# Patient Record
Sex: FEMALE
[De-identification: names, ages, dates, MRNs, and addresses within clinical notes are randomized per-mention and may not be internally consistent; named-entity substitution may affect disease eponyms.]

---

## 2024-08-22 ENCOUNTER — ASOB RESULT (OUTPATIENT)
Age: 34
End: 2024-08-22

## 2024-08-22 ENCOUNTER — APPOINTMENT (OUTPATIENT)
Dept: ANTEPARTUM | Facility: CLINIC | Age: 34
End: 2024-08-22

## 2024-08-22 ENCOUNTER — LABORATORY RESULT (OUTPATIENT)
Age: 34
End: 2024-08-22

## 2024-08-22 PROCEDURE — 76801 OB US < 14 WKS SINGLE FETUS: CPT | Mod: 59

## 2024-08-22 PROCEDURE — 76813 OB US NUCHAL MEAS 1 GEST: CPT

## 2024-10-10 PROBLEM — Z00.00 ENCOUNTER FOR PREVENTIVE HEALTH EXAMINATION: Status: ACTIVE | Noted: 2024-10-10

## 2024-10-14 ENCOUNTER — ASOB RESULT (OUTPATIENT)
Age: 34
End: 2024-10-14

## 2024-10-14 ENCOUNTER — APPOINTMENT (OUTPATIENT)
Dept: ANTEPARTUM | Facility: CLINIC | Age: 34
End: 2024-10-14
Payer: MEDICAID

## 2024-10-14 PROCEDURE — 76811 OB US DETAILED SNGL FETUS: CPT

## 2025-03-01 ENCOUNTER — INPATIENT (INPATIENT)
Facility: HOSPITAL | Age: 35
LOS: 1 days | Discharge: HOME | End: 2025-03-03
Attending: STUDENT IN AN ORGANIZED HEALTH CARE EDUCATION/TRAINING PROGRAM | Admitting: STUDENT IN AN ORGANIZED HEALTH CARE EDUCATION/TRAINING PROGRAM

## 2025-03-01 VITALS
DIASTOLIC BLOOD PRESSURE: 74 MMHG | HEART RATE: 90 BPM | TEMPERATURE: 98 F | SYSTOLIC BLOOD PRESSURE: 130 MMHG | RESPIRATION RATE: 17 BRPM

## 2025-03-01 LAB
BASOPHILS # BLD AUTO: 0.06 K/UL — SIGNIFICANT CHANGE UP (ref 0–0.2)
BASOPHILS NFR BLD AUTO: 0.6 % — SIGNIFICANT CHANGE UP (ref 0–2)
BLD GP AB SCN SERPL QL: NEGATIVE — SIGNIFICANT CHANGE UP
EOSINOPHIL # BLD AUTO: 0.14 K/UL — SIGNIFICANT CHANGE UP (ref 0–0.5)
EOSINOPHIL NFR BLD AUTO: 1.4 % — SIGNIFICANT CHANGE UP (ref 0–6)
HCT VFR BLD CALC: 37 % — SIGNIFICANT CHANGE UP (ref 34.5–45)
HGB BLD-MCNC: 12.9 G/DL — SIGNIFICANT CHANGE UP (ref 11.5–15.5)
IANC: 7.54 K/UL — HIGH (ref 1.8–7.4)
IMM GRANULOCYTES NFR BLD AUTO: 1.1 % — HIGH (ref 0–0.9)
LYMPHOCYTES # BLD AUTO: 1.63 K/UL — SIGNIFICANT CHANGE UP (ref 1–3.3)
LYMPHOCYTES # BLD AUTO: 16.1 % — SIGNIFICANT CHANGE UP (ref 13–44)
MCHC RBC-ENTMCNC: 30.9 PG — SIGNIFICANT CHANGE UP (ref 27–34)
MCHC RBC-ENTMCNC: 34.9 G/DL — SIGNIFICANT CHANGE UP (ref 32–36)
MCV RBC AUTO: 88.5 FL — SIGNIFICANT CHANGE UP (ref 80–100)
MONOCYTES # BLD AUTO: 0.63 K/UL — SIGNIFICANT CHANGE UP (ref 0–0.9)
MONOCYTES NFR BLD AUTO: 6.2 % — SIGNIFICANT CHANGE UP (ref 2–14)
NEUTROPHILS # BLD AUTO: 7.54 K/UL — HIGH (ref 1.8–7.4)
NEUTROPHILS NFR BLD AUTO: 74.6 % — SIGNIFICANT CHANGE UP (ref 43–77)
NRBC # BLD AUTO: 0 K/UL — SIGNIFICANT CHANGE UP (ref 0–0)
NRBC # FLD: 0 K/UL — SIGNIFICANT CHANGE UP (ref 0–0)
NRBC BLD AUTO-RTO: 0 /100 WBCS — SIGNIFICANT CHANGE UP (ref 0–0)
PLATELET # BLD AUTO: 268 K/UL — SIGNIFICANT CHANGE UP (ref 150–400)
RBC # BLD: 4.18 M/UL — SIGNIFICANT CHANGE UP (ref 3.8–5.2)
RBC # FLD: 13.1 % — SIGNIFICANT CHANGE UP (ref 10.3–14.5)
RH IG SCN BLD-IMP: POSITIVE — SIGNIFICANT CHANGE UP
RH IG SCN BLD-IMP: POSITIVE — SIGNIFICANT CHANGE UP
WBC # BLD: 10.11 K/UL — SIGNIFICANT CHANGE UP (ref 3.8–10.5)
WBC # FLD AUTO: 10.11 K/UL — SIGNIFICANT CHANGE UP (ref 3.8–10.5)

## 2025-03-01 RX ORDER — SODIUM CHLORIDE 9 G/1000ML
1000 INJECTION, SOLUTION INTRAVENOUS
Refills: 0 | Status: DISCONTINUED | OUTPATIENT
Start: 2025-03-01 | End: 2025-03-02

## 2025-03-01 RX ORDER — OXYTOCIN-SODIUM CHLORIDE 0.9% IV SOLN 30 UNIT/500ML 30-0.9/5 UT/ML-%
167 SOLUTION INTRAVENOUS
Qty: 30 | Refills: 0 | Status: DISCONTINUED | OUTPATIENT
Start: 2025-03-01 | End: 2025-03-02

## 2025-03-01 RX ORDER — CITRIC ACID/SODIUM CITRATE 300-500 MG
15 SOLUTION, ORAL ORAL EVERY 6 HOURS
Refills: 0 | Status: DISCONTINUED | OUTPATIENT
Start: 2025-03-01 | End: 2025-03-02

## 2025-03-01 RX ORDER — INFLUENZA A VIRUS A/IDAHO/07/2018 (H1N1) ANTIGEN (MDCK CELL DERIVED, PROPIOLACTONE INACTIVATED, INFLUENZA A VIRUS A/INDIANA/08/2018 (H3N2) ANTIGEN (MDCK CELL DERIVED, PROPIOLACTONE INACTIVATED), INFLUENZA B VIRUS B/SINGAPORE/INFTT-16-0610/2016 ANTIGEN (MDCK CELL DERIVED, PROPIOLACTONE INACTIVATED), INFLUENZA B VIRUS B/IOWA/06/2017 ANTIGEN (MDCK CELL DERIVED, PROPIOLACTONE INACTIVATED) 15; 15; 15; 15 UG/.5ML; UG/.5ML; UG/.5ML; UG/.5ML
0.5 INJECTION, SUSPENSION INTRAMUSCULAR ONCE
Refills: 0 | Status: DISCONTINUED | OUTPATIENT
Start: 2025-03-01 | End: 2025-03-03

## 2025-03-01 RX ADMIN — SODIUM CHLORIDE 125 MILLILITER(S): 9 INJECTION, SOLUTION INTRAVENOUS at 18:24

## 2025-03-01 RX ADMIN — Medication 1 APPLICATION(S): at 18:19

## 2025-03-01 NOTE — OB RN PATIENT PROFILE - BREASTFEEDING PROVIDES STABLE TEMPERATURE THROUGH SKIN TO SKIN CONTACT
Onset: 1/15/18 930p  Location / description: Pt states that R upper arm started visibly twitching while she was holding her camera in her L hand and scrolling through pictures. States that the twitching was intense, but would not consider them painful; has since resolved. States that they last a few seconds, intermittently occurring over a couple of minutes. Reports that her  mentioned that he has historically noticed her arm twitching while she sleeps, but \"forgot to tell her about it.\" Pt denies any CP, SOB, erythema, edema, weakness, numbness, trauma, or strenuous activity.   States that she is unsure if this has anything to do with her heart or other acute symptoms that she has been experiencing over that last few months. States that she does not have a PCP established at this time.   Precipitating Factors: as above.   Pain Scale (1 - 10), 10 highest: none.   Associated Symptoms: as above.   What improves/worsens symptoms: symptoms have resolved.   Symptom specific medications: none reported.   LMP : No LMP recorded.  Are you pregnant or breast feeding: not discussed.   Recent Care: 12/20/17 ED    Care advice provided per protocol. Writer instructed pt to maintain log of future occurrences. Writer encouraged pt to f/u with PCP about concerns once established, and if emergent symptoms develop, go to the ED to be evaluated. Pt verbalizes understanding and will comply. Advised to call the Call Center back with any other concerns.       Reason for Disposition  • Nursing judgment    Protocols used: NO GUIDELINE OR REFERENCE [id number]      
Regarding: Arm (R) muscle spasming  ----- Message from Gabriela Shah sent at 1/15/2018  9:42 PM CST -----  Patient Name: Kate Tijerina  Specialist or PCP: Dr. Flood  Pregnant (If Yes, how long?): no  Symptoms: Arm (R) muscle spasming  Call Back #: [phone]  Is the patient’s permanent residence located in [place], [place], or a [facility] Yes [facility] [address]  Call Center Account #: [phone]      
Statement Selected

## 2025-03-01 NOTE — OB PROVIDER H&P - NSHPPHYSICALEXAM_GEN_ALL_CORE
Physical Exam  CV: clinically well perfused  Pulm: breathing comfortably on RA  Abd: gravid, nontender  Extr: moving all extremities with ease  – VE: 1/50/-3  – FHT: baseline 130, mod variability, +accels, -decels  – [place]: irregular  – Sono: vertex

## 2025-03-01 NOTE — OB PROVIDER H&P - ASSESSMENT
Assessment  34y  at 40w presents for eIOL.    -Admit to L&D  -Routine labs   -EFM & toco  -CLD & IVF        -GBS neg      -Epidural PRN  -For induction with buccal cytotec and cervical balloon     Plan per attending physician, Dr. Fernanda Emery   PGY-1

## 2025-03-01 NOTE — OB RN PATIENT PROFILE - HEIGHT IN INCHES
----- Message from Clarissa Connolly RN sent at 9/9/2022  3:51 PM EDT -----  Regarding: Appt scheduling  [facility],    Would you be able to assist pt with scheduling a PCP f/u appt with Dr Isabel Kiser at [facility]. She was a \"no show\" for her 7/18 appt. Could you assist pt with getting scheduled to see Dr Vimal Mckinley at [facility] ([phone]). Pt was to f/u after her ED visit from 4/26/2022. Please let me know if you have any questions. Thank you for all of your help!   Sylvester Patel 4

## 2025-03-01 NOTE — OB PROVIDER H&P - HISTORY OF PRESENT ILLNESS
Admission H&P    Subjective  HPI: 34y  at 40w presents for eIOL. +FM. -LOF. -CTXs. -VB. Pt denies any other concerns at this time. Denies complications this pregnancy. No HA, vision changes, RUQ pain, CP or SOB.    – PNC: Denies prenatal issues. GBS neg.  EFW 3500g by sono.  – OBHx: 2 prior vaginal deliveries,  (6lb 12 oz) and  (6 lb 14 oz)  – GynHx: denies fibroids, abnormal pap smears, STIs  – PMH: denies  – PSH: denies  – Meds: PNV   – Allergies: NKDA

## 2025-03-01 NOTE — OB PROVIDER H&P - NSLOWPPHRISK_OBGYN_A_OB
No previous uterine incision/Butcher Pregnancy/Less than or equal to 4 previous vaginal births/No known bleeding disorder/No history of postpartum hemorrhage/No other PPH risks indicated

## 2025-03-01 NOTE — OB RN PATIENT PROFILE - THE METHOD OF FEEDING WHEN THE NEWBORN REQUESTS AND CONTINUING EACH FEEDING SESSION UNTIL THE NEWBORN IS SATISFIED
06/21/23  KENDRICK for pt to schedule an appt  Rachel  
06/22/23  KENDRICK for pt to schedule an appt  Rachel  
06/23/23  Pt is now scheduled 08/08 with Katerin Pérez.  Rachel  
Routing refill request to provider for review/approval because:  LOV 5/05/2022    Care team please reach out to patient to schedule appointment     SSRIs Protocol Failed     PHQ-9 score less than 5 in past 6 months    Recent (6 mo) or future (30 days) visit within the authorizing provider's specialty           VISHNU Cunha  [facility]  
Statement Selected

## 2025-03-01 NOTE — OB RN PATIENT PROFILE - EXTENSIONS OF SELF_ADULT
Levator Labii Superioris Units: 0 Additional Area 1 Location: inferior medial orbicularis Dilution (U/0.1 Cc): 4 Expiration Date (Month Year): 2/2024 Consent: Written consent obtained. Risks include but not limited to lid/brow ptosis, bruising, swelling, diplopia, temporary effect, incomplete chemical denervation. Including Pricing Information In The Note: No Lot #: [id number] Post-Care Instructions: Patient instructed to not lie down for 4 hours and limit physical activity for 24 hours. Patient instructed not to travel by airplane for 48 hours. Detail Level: Detailed Document As Units Or Cc?: units None

## 2025-03-01 NOTE — OB RN PATIENT PROFILE - NS MD HP INPLANTS MED DEV
Patient wife called back with medication information.  The primary wants patient to take 25mg metoprolol twice daily instead of 50mg. twice daily.    Please call and discuss.  Thank you   Silvana   None

## 2025-03-01 NOTE — OB PROVIDER H&P - OB VTE ASSESSMENT
Last vitals:   Vitals:    04/19/17 2250   BP: 122/54   Pulse: 75   Resp: 16   Temp: 36.2  C (97.2  F)   SpO2: 99%     Patient's level of consciousness is awake  Spontaneous respirations: yes  Maintains airway independently: yes  Dentition unchanged: yes  Oropharynx: oropharynx clear of all foreign objects    QCDR Measures:  ASA# 20 - Surgical Safety Checklist: ASA20A - Safety Checks Done  PQRS# 430 - Adult PONV Prevention: NA - Not adult patient, not GA or 3 or more risk factors NOT present  ASA# 8 - Peds PONV Prevention: NA - Not pediatric patient, not GA or 2 or more risk factors NOT present  PQRS# 424 - Telma-op Temp Management: 4559F - At least one body temp DOCUMENTED => 35.5C or 95.9F within required timeframe  PQRS# 426 - PACU Transfer Protocol: - Transfer of care checklist used  ASA# 14 - Acute Post-op Pain: ASA14B - Patient did NOT experience pain >= 7 out of 10    I completed my SBAR handoff to the receiving nurse per policy and procedure.  
<<--- Click to launch

## 2025-03-02 LAB — T PALLIDUM AB TITR SER: NEGATIVE — SIGNIFICANT CHANGE UP

## 2025-03-02 RX ORDER — DIPHENHYDRAMINE HCL 12.5MG/5ML
25 ELIXIR ORAL EVERY 6 HOURS
Refills: 0 | Status: DISCONTINUED | OUTPATIENT
Start: 2025-03-02 | End: 2025-03-03

## 2025-03-02 RX ORDER — WITCH HAZEL LEAF
1 FLUID EXTRACT MISCELLANEOUS EVERY 4 HOURS
Refills: 0 | Status: DISCONTINUED | OUTPATIENT
Start: 2025-03-02 | End: 2025-03-03

## 2025-03-02 RX ORDER — CLOSTRIDIUM TETANI TOXOID ANTIGEN (FORMALDEHYDE INACTIVATED), CORYNEBACTERIUM DIPHTHERIAE TOXOID ANTIGEN (FORMALDEHYDE INACTIVATED), BORDETELLA PERTUSSIS TOXOID ANTIGEN (GLUTARALDEHYDE INACTIVATED), BORDETELLA PERTUSSIS FILAMENTOUS HEMAGGLUTININ ANTIGEN (FORMALDEHYDE INACTIVATED), BORDETELLA PERTUSSIS PERTACTIN ANTIGEN, AND BORDETELLA PERTUSSIS FIMBRIAE 2/3 ANTIGEN 5; 2; 2.5; 5; 3; 5 [LF]/.5ML; [LF]/.5ML; UG/.5ML; UG/.5ML; UG/.5ML; UG/.5ML
0.5 INJECTION, SUSPENSION INTRAMUSCULAR ONCE
Refills: 0 | Status: DISCONTINUED | OUTPATIENT
Start: 2025-03-02 | End: 2025-03-03

## 2025-03-02 RX ORDER — OXYCODONE HYDROCHLORIDE 30 MG/1
5 TABLET ORAL
Refills: 0 | Status: DISCONTINUED | OUTPATIENT
Start: 2025-03-02 | End: 2025-03-03

## 2025-03-02 RX ORDER — PRENATAL 136/IRON/FOLIC ACID 27 MG-1 MG
1 TABLET ORAL DAILY
Refills: 0 | Status: DISCONTINUED | OUTPATIENT
Start: 2025-03-02 | End: 2025-03-03

## 2025-03-02 RX ORDER — BENZOCAINE 220 MG/G
1 SPRAY, METERED PERIODONTAL EVERY 6 HOURS
Refills: 0 | Status: DISCONTINUED | OUTPATIENT
Start: 2025-03-02 | End: 2025-03-03

## 2025-03-02 RX ORDER — MODIFIED LANOLIN 100 %
1 CREAM (GRAM) TOPICAL EVERY 6 HOURS
Refills: 0 | Status: DISCONTINUED | OUTPATIENT
Start: 2025-03-02 | End: 2025-03-03

## 2025-03-02 RX ORDER — SIMETHICONE 80 MG
80 TABLET,CHEWABLE ORAL EVERY 4 HOURS
Refills: 0 | Status: DISCONTINUED | OUTPATIENT
Start: 2025-03-02 | End: 2025-03-03

## 2025-03-02 RX ORDER — IBUPROFEN 200 MG
600 TABLET ORAL EVERY 6 HOURS
Refills: 0 | Status: DISCONTINUED | OUTPATIENT
Start: 2025-03-02 | End: 2025-03-03

## 2025-03-02 RX ORDER — DIBUCAINE 10 MG/G
1 OINTMENT TOPICAL EVERY 6 HOURS
Refills: 0 | Status: DISCONTINUED | OUTPATIENT
Start: 2025-03-02 | End: 2025-03-03

## 2025-03-02 RX ORDER — MAGNESIUM HYDROXIDE 400 MG/5ML
30 SUSPENSION ORAL
Refills: 0 | Status: DISCONTINUED | OUTPATIENT
Start: 2025-03-02 | End: 2025-03-03

## 2025-03-02 RX ORDER — OXYCODONE HYDROCHLORIDE 30 MG/1
5 TABLET ORAL ONCE
Refills: 0 | Status: DISCONTINUED | OUTPATIENT
Start: 2025-03-02 | End: 2025-03-03

## 2025-03-02 RX ORDER — ACETAMINOPHEN 500 MG/5ML
975 LIQUID (ML) ORAL
Refills: 0 | Status: DISCONTINUED | OUTPATIENT
Start: 2025-03-02 | End: 2025-03-03

## 2025-03-02 RX ORDER — IBUPROFEN 200 MG
600 TABLET ORAL EVERY 6 HOURS
Refills: 0 | Status: COMPLETED | OUTPATIENT
Start: 2025-03-02 | End: 2026-01-29

## 2025-03-02 RX ORDER — OXYTOCIN-SODIUM CHLORIDE 0.9% IV SOLN 30 UNIT/500ML 30-0.9/5 UT/ML-%
SOLUTION INTRAVENOUS
Qty: 30 | Refills: 0 | Status: DISCONTINUED | OUTPATIENT
Start: 2025-03-02 | End: 2025-03-02

## 2025-03-02 RX ORDER — OXYTOCIN-SODIUM CHLORIDE 0.9% IV SOLN 30 UNIT/500ML 30-0.9/5 UT/ML-%
167 SOLUTION INTRAVENOUS
Qty: 30 | Refills: 0 | Status: DISCONTINUED | OUTPATIENT
Start: 2025-03-02 | End: 2025-03-02

## 2025-03-02 RX ORDER — PRAMOXINE HCL 1 %
1 GEL (GRAM) TOPICAL EVERY 4 HOURS
Refills: 0 | Status: DISCONTINUED | OUTPATIENT
Start: 2025-03-02 | End: 2025-03-03

## 2025-03-02 RX ORDER — KETOROLAC TROMETHAMINE 30 MG/ML
30 INJECTION, SOLUTION INTRAMUSCULAR; INTRAVENOUS ONCE
Refills: 0 | Status: DISCONTINUED | OUTPATIENT
Start: 2025-03-02 | End: 2025-03-03

## 2025-03-02 RX ORDER — HYDROCORTISONE 10 MG/G
1 CREAM TOPICAL EVERY 6 HOURS
Refills: 0 | Status: DISCONTINUED | OUTPATIENT
Start: 2025-03-02 | End: 2025-03-03

## 2025-03-02 RX ADMIN — OXYTOCIN-SODIUM CHLORIDE 0.9% IV SOLN 30 UNIT/500ML 2 MILLIUNIT(S)/MIN: 30-0.9/5 SOLUTION at 06:12

## 2025-03-02 RX ADMIN — Medication 975 MILLIGRAM(S): at 15:45

## 2025-03-02 RX ADMIN — Medication 3 MILLILITER(S): at 22:00

## 2025-03-02 RX ADMIN — Medication 600 MILLIGRAM(S): at 18:42

## 2025-03-02 RX ADMIN — Medication 975 MILLIGRAM(S): at 16:59

## 2025-03-02 RX ADMIN — Medication 600 MILLIGRAM(S): at 19:20

## 2025-03-02 RX ADMIN — Medication 3 MILLILITER(S): at 18:59

## 2025-03-02 NOTE — OB PROVIDER LABOR PROGRESS NOTE - NS_SUBJECTIVE/OBJECTIVE_OBGYN_ALL_OB_FT
Reassessed patient in her labor course after her cervical balloon came out.
Reassessed patient in her labor course. Per RN, patient SROMd to light [facility] around 4 am.
Reassessed patient in her labor course for cervical balloon placement.

## 2025-03-02 NOTE — DISCHARGE NOTE OB - MEDICATION SUMMARY - MEDICATIONS TO TAKE
I will START or STAY ON the medications listed below when I get home from the hospital:    ibuprofen 600 mg oral tablet  -- 1 tab(s) by mouth every 6 hours as needed for  moderate pain  -- Indication: For Moderate pain    acetaminophen 325 mg oral tablet  -- 3 tab(s) by mouth every 6 hours as needed for  mild pain  -- Indication: For Mild pain    Prenatal Multivitamins with Folic Acid 1 mg oral tablet  -- 1 tab(s) by mouth once a day  -- Indication: For Vitamins

## 2025-03-02 NOTE — OB PROVIDER DELIVERY SUMMARY - NSSELHIDDEN_OBGYN_ALL_OB_FT
[NS_DeliveryAttending1_OBGYN_ALL_OB_FT:[id number]],[NS_DeliveryAssist1_OBGYN_ALL_OB_FT:[id number]=]

## 2025-03-02 NOTE — DISCHARGE NOTE OB - FINANCIAL ASSISTANCE
[facility] provides services at a reduced cost to those who are determined to be eligible through [facility]’s financial assistance program. Information regarding [facility]’s financial assistance program can be found by going to [email or web address] or by calling [phone].

## 2025-03-02 NOTE — DISCHARGE NOTE OB - PATIENT PORTAL LINK FT
You can access the FollowMyHealth Patient Portal offered by [facility] by registering at the following website: [email or web address]. By joining [facility]’s FollowMyHealth portal, you will also be able to view your health information using other applications (apps) compatible with our system.

## 2025-03-02 NOTE — DISCHARGE NOTE OB - PERSISTENT HEADACHE, BLURRED VISION
Spoke with patient. She is having diarrhea, blood in her stool, abdominal pain, and nausea like she did when she had C Diff last month.     Dr. Dewitt - Okay to check stool for C Diff?   Statement Selected

## 2025-03-02 NOTE — OB PROVIDER LABOR PROGRESS NOTE - NS_OBIHIFHRDETAILS_OBGYN_ALL_OB_FT
140/mod inga/+accels/-decels
140/mod inga/+accels/intermittent variable decel
140/mod inga/+accels/-decels

## 2025-03-02 NOTE — OB RN DELIVERY SUMMARY - NS_SEPSISRSKCALC_OBGYN_ALL_OB_FT
EOS calculated successfully. EOS Risk Factor: 0.5/1000 live births ([facility] national incidence); GA=40w1d; Temp=99.68; ROM=4.767; GBS='Negative'; Antibiotics='No antibiotics or any antibiotics < 2 hrs prior to birth'

## 2025-03-02 NOTE — OB RN DELIVERY SUMMARY - NSSELHIDDEN_OBGYN_ALL_OB_FT
[NS_DeliveryAttending1_OBGYN_ALL_OB_FT:[id number]],[NS_DeliveryAssist1_OBGYN_ALL_OB_FT:[id number]],[NS_DeliveryRN_OBGYN_ALL_OB_FT:[id number]=]

## 2025-03-02 NOTE — OB PROVIDER LABOR PROGRESS NOTE - ASSESSMENT
-SROM to light mec at ~4am  -s/p CB  -s/p buccal cytotec x1, has been norma too frequently for more cytotec or pitocin   -VE now /-1  -continue EFM/toco  -anticipate     Yue Emery PGY1  Notified Dr. Michael     
-s/p cervical balloon  -s/p buccal cyto x1 dose, has been norma too frequently for subsequent doses  -to start pit 2/2 when able  -continue EFM/toco    Yue Emery PGY1  D/w Dr. Michael 
-cervical balloon placed, patient tolerated well  -s/p BC#1 at 7:50 PM, continue q3hrs   -continue EFM/luis Emery PGY1

## 2025-03-02 NOTE — OB RN DELIVERY SUMMARY - NS_CORDBLDTYPEA_OBGYN_ALL_OB
Yes Patient received in intake room 3. Patient's preferred language Wolof;  Waqas Rice present at bedside to interpret during patient encounter. Patient A&Ox3 and ambulatory at baseline. Patient presents to the ED c/o vaginal bleeding x1 week. phx hypothyroidism (currently taking levothyroxine) , LMP 3/1; patient approximately 6 weeks pregnant. Patient states she has been experiencing vaginal bleeding for approximately one week; patient states "blood is brown with small clots". Patient denies headache, dizziness, lightheadedness, nausea/vomiting, fever/chills, and pain. Patient offers no complaints at this time. Respirations even and unlabored, no signs/symptoms of acute distress; patient denies dyspnea, shortness of breath, and chest pain. 20G IV placed to left AC, labs collected and sent, urine collected and sent, patient is stable at this time. Steady gait observed.

## 2025-03-02 NOTE — OB PROVIDER DELIVERY SUMMARY - NSPROVIDERDELIVERYNOTE_OBGYN_ALL_OB_FT
The patient became fully dilated with urge to push.  of a viable male infant. Head, shoulders and body delivered easily in OA position. Nuchal x2, easily reduced. There was delayed cord clamping of 1min. Cord gases obtained. The placenta delivered spontaneously, intact. Pitocin was administered. The uterus, cervix, vagina and perineum were palpated and inspected, no retained products were noted. Fundus and lower uterine segment firm. Second degree laceration of the perineum noted. The laceration was repaired with chromic gut in the usual manner with restoration of anatomy and excellent hemostasis.    Present for delivery were Dr. Hernandez, Dr. Cyrus Bridges PGY1

## 2025-03-02 NOTE — DISCHARGE NOTE OB - PLAN OF CARE
Routine pp care After discharge, please stay on pelvic rest for 6 weeks, meaning no sexual intercourse, no tampons and no douching.  No driving for 2 weeks.  No lifting objects heavier than baby for 2 weeks.  Expect to have vaginal bleeding/spotting for up to six weeks.  The bleeding should get lighter and more white/light brown with time.  For bleeding soaking more than a pad an hour or passing clots greater than the size of your fist, come in to the emergency department.  Follow up in the office in 6 weeks

## 2025-03-02 NOTE — DISCHARGE NOTE OB - NS MD DC FALL RISK RISK
For information on Fall & Injury Prevention, visit: [email or web address] OR  [email or web address] OR  https://www.cdc.gov/steadi/patient.html

## 2025-03-02 NOTE — DISCHARGE NOTE OB - CARE PLAN
Assessment and plan of treatment:	Routine pp care   Principal Discharge DX:	Vaginal delivery  Assessment and plan of treatment:	After discharge, please stay on pelvic rest for 6 weeks, meaning no sexual intercourse, no tampons and no douching.  No driving for 2 weeks.  No lifting objects heavier than baby for 2 weeks.  Expect to have vaginal bleeding/spotting for up to six weeks.  The bleeding should get lighter and more white/light brown with time.  For bleeding soaking more than a pad an hour or passing clots greater than the size of your fist, come in to the emergency department.  Follow up in the office in 6 weeks   1

## 2025-03-02 NOTE — DISCHARGE NOTE OB - CARE PROVIDER_API CALL
Gabriela Michael  Obstetrics and Gynecology  [address] [place] [id number]  Phone: [phone]  Fax: [phone]  Follow Up Time:

## 2025-03-03 VITALS
OXYGEN SATURATION: 99 % | DIASTOLIC BLOOD PRESSURE: 63 MMHG | TEMPERATURE: 98 F | HEART RATE: 77 BPM | SYSTOLIC BLOOD PRESSURE: 113 MMHG | RESPIRATION RATE: 18 BRPM

## 2025-03-03 LAB
HCT VFR BLD CALC: 35.5 % — SIGNIFICANT CHANGE UP (ref 34.5–45)
HGB BLD-MCNC: 11.9 G/DL — SIGNIFICANT CHANGE UP (ref 11.5–15.5)

## 2025-03-03 RX ORDER — ACETAMINOPHEN 500 MG/5ML
3 LIQUID (ML) ORAL
Qty: 0 | Refills: 0 | DISCHARGE
Start: 2025-03-03

## 2025-03-03 RX ORDER — PRENATAL 136/IRON/FOLIC ACID 27 MG-1 MG
1 TABLET ORAL
Qty: 0 | Refills: 0 | DISCHARGE
Start: 2025-03-03

## 2025-03-03 RX ORDER — IBUPROFEN 200 MG
1 TABLET ORAL
Qty: 0 | Refills: 0 | DISCHARGE
Start: 2025-03-03

## 2025-03-03 RX ADMIN — Medication 975 MILLIGRAM(S): at 10:15

## 2025-03-03 RX ADMIN — Medication 975 MILLIGRAM(S): at 09:41

## 2025-03-03 RX ADMIN — Medication 3 MILLILITER(S): at 06:18

## 2025-03-03 RX ADMIN — Medication 600 MILLIGRAM(S): at 06:00

## 2025-03-03 RX ADMIN — Medication 600 MILLIGRAM(S): at 06:32

## 2025-03-03 NOTE — PROGRESS NOTE ADULT - ASSESSMENT
NP:  day 1 Progress Note:     Patient seen at bedside resting comfortably offers no current complaints. Ambulating and voiding without difficulty.  Passing flatus and tolerating regular diet.  Bonding well with .  Breastfeeding exclusively . Denies HA, CP, SOB, N/V/D, dizziness, palpitations,  worsening vaginal bleeding, or any other concerns.      Vital Signs Last 24 Hrs  T(C): 36.4 (03 Mar 2025 05:22), Max: 37.4 (02 Mar 2025 09:12)  T(F): 97.6 (03 Mar 2025 05:22), Max: 99.3 (02 Mar 2025 09:12)  HR: 69 (03 Mar 2025 05:22) (69 - 119)  BP: 111/57 (03 Mar 2025 05:22) (108/62 - 133/73)  BP(mean): --  RR: 18 (03 Mar 2025 05:22) (16 - 18)  SpO2: 98% (03 Mar 2025 05:22) (96% - 100%)    Parameters below as of 03 Mar 2025 05:22  Patient On (Oxygen Delivery Method): room air        Physical Exam:     Gen: A&Ox 3, NAD  Chest: CTA B/L  Cardiac: S1,S2; RRR  Breast: Soft, nontender, nonengorged  Abdomen: +BS, Soft, nontender, ND; Fundus firm below umbilicus  Gyn: mod lochia, intact/repaired  Ext: Nontender, DTRS 2+, no worsening edema                          11.9   x     )-----------( x        ( 03 Mar 2025 05:15 )             35.5       A/P: 34yr old F PPD#1 s/p  3/2/2025    - Meeting PP milestones   - Continue pain management  - Encourage OOB and ambulation  - Cont routine PP care  - Discharge planning, instructions provided   - Pt to RTO in 6wks for PPV or as needed     ABEBA Batres NP

## 2025-03-05 ENCOUNTER — APPOINTMENT (OUTPATIENT)
Age: 35
End: 2025-03-05
Payer: MEDICAID

## 2025-03-05 PROCEDURE — S9445: CPT | Mod: 95

## 2025-04-03 ENCOUNTER — TRANSCRIPTION ENCOUNTER (OUTPATIENT)
Age: 35
End: 2025-04-03